# Patient Record
Sex: MALE | Race: OTHER | HISPANIC OR LATINO | Employment: FULL TIME | URBAN - NONMETROPOLITAN AREA
[De-identification: names, ages, dates, MRNs, and addresses within clinical notes are randomized per-mention and may not be internally consistent; named-entity substitution may affect disease eponyms.]

---

## 2020-12-11 ENCOUNTER — OFFICE VISIT (OUTPATIENT)
Dept: NEUROSURGERY | Facility: CLINIC | Age: 60
End: 2020-12-11

## 2020-12-11 VITALS — BODY MASS INDEX: 31.9 KG/M2 | WEIGHT: 215.4 LBS | HEIGHT: 69 IN | TEMPERATURE: 98.7 F | RESPIRATION RATE: 15 BRPM

## 2020-12-11 DIAGNOSIS — M54.2 DISCOGENIC CERVICAL PAIN: ICD-10-CM

## 2020-12-11 DIAGNOSIS — M53.9 MULTILEVEL DEGENERATIVE DISC DISEASE: ICD-10-CM

## 2020-12-11 DIAGNOSIS — M25.512 PAIN IN JOINT OF LEFT SHOULDER: ICD-10-CM

## 2020-12-11 DIAGNOSIS — M51.36 DISCOGENIC LOW BACK PAIN: Primary | ICD-10-CM

## 2020-12-11 PROCEDURE — 99204 OFFICE O/P NEW MOD 45 MIN: CPT | Performed by: NEUROLOGICAL SURGERY

## 2020-12-11 RX ORDER — NAPROXEN SODIUM 220 MG
220 TABLET ORAL 2 TIMES DAILY PRN
COMMUNITY

## 2020-12-11 RX ORDER — METHOCARBAMOL 750 MG/1
TABLET, FILM COATED ORAL
COMMUNITY
Start: 2020-12-08

## 2020-12-11 NOTE — PROGRESS NOTES
"  NAME: PORSHA DASILVA   DOS: 2020  : 1960  PCP: Jostin Mondragon MD    Chief Complaint:    Chief Complaint   Patient presents with   • Neck & Back pain   • Dizziness/Headaches   • Left shoulder pain       History of Present Illness:  60 y.o. male   I saw this 60-year-old in neurosurgical consultation he presented with a very complex history intake he reports no history of prior back issues he was injured on the job in 2 subsequent issues 1 where he was \"smashed into a metal cage \"the second where he had a lifting injury these were  by months apart he did not seek medical therapy for the first 1 but treated himself for left shoulder arm and neck pain with a sling and eventually made it through that until his second injury since that time though during both injuries it is difficult to ascertain the degree of back and neck issues have but he is clearly been plagued by symptoms of back pain instability gait shaking legs pain in his left shoulder and pain throughout his spinal axis    He has not seen an interventional pain [place] specialist    He is under the care of an orthopedist regarding his left shoulder he is here for evaluation he was reportedly turned down by Worker's Comp.      PMHX  Allergies:  Allergies   Allergen Reactions   • Codeine Other (See Comments)     Unsure of reaction      Medications    Current Outpatient Medications:   •  DICLOFENAC SODIUM PO, Take  by mouth., Disp: , Rfl:   •  naproxen sodium (ALEVE) 220 MG tablet, Take 220 mg by mouth 2 (Two) Times a Day As Needed., Disp: , Rfl:   •  methocarbamol (ROBAXIN) 750 MG tablet, , Disp: , Rfl:   Past Medical History:  Past Medical History:   Diagnosis Date   • Headache      Past Surgical History:  Past Surgical History:   Procedure Laterality Date   • KNEE SURGERY     • SHOULDER ARTHROSCOPY       Social Hx:  Social History     Tobacco Use   • Smoking status: Current Every Day Smoker   • Smokeless tobacco: Never Used "   Substance Use Topics   • Alcohol use: Yes   • Drug use: Never     Family Hx:  History reviewed. No pertinent family history.  Review of Systems:        Review of Systems   Constitutional: Positive for activity change. Negative for appetite change, chills, diaphoresis, fatigue, fever and unexpected weight change.   HENT: Negative for congestion, dental problem, drooling, ear discharge, ear pain, facial swelling, hearing loss, mouth sores, nosebleeds, postnasal drip, rhinorrhea, sinus pressure, sneezing, sore throat, tinnitus, trouble swallowing and voice change.    Eyes: Positive for visual disturbance. Negative for photophobia, pain, discharge, redness and itching.   Respiratory: Negative for apnea, cough, choking, chest tightness, shortness of breath, wheezing and stridor.    Cardiovascular: Negative for chest pain, palpitations and leg swelling.   Gastrointestinal: Positive for nausea. Negative for abdominal distention, abdominal pain, anal bleeding, blood in stool, constipation, diarrhea, rectal pain and vomiting.   Endocrine: Negative for cold intolerance, heat intolerance, polydipsia, polyphagia and polyuria.   Genitourinary: Negative for decreased urine volume, difficulty urinating, dysuria, enuresis, flank pain, frequency, genital sores, hematuria and urgency.   Musculoskeletal: Positive for back pain, neck pain and neck stiffness. Negative for arthralgias, gait problem, joint swelling and myalgias.   Skin: Negative for color change, pallor, rash and wound.   Allergic/Immunologic: Negative for environmental allergies, food allergies and immunocompromised state.   Neurological: Positive for tremors, light-headedness and headaches. Negative for dizziness, seizures, syncope, facial asymmetry, speech difficulty, weakness and numbness.   Hematological: Negative for adenopathy. Does not bruise/bleed easily.   Psychiatric/Behavioral: Positive for sleep disturbance. Negative for agitation, behavioral problems,  confusion, decreased concentration, dysphoric mood, hallucinations, self-injury and suicidal ideas. The patient is not nervous/anxious and is not hyperactive.    All other systems reviewed and are negative.       I have reviewed this note template and all pertinent parts of the review of systems social, family history, surgical history and medication list significant for headache dizziness but no evidence of TIAs or unilateral symptomatology to be consistent with stroke or amaurosis he has some vertiginous symptoms, significant for some Raynaud's types of phenomenon's in his left upper extremity that resolved    Suggestive  Physical Examination:  Vitals:    12/11/20 1116   Resp: 15   Temp: 98.7 °F (37.1 °C)      General Appearance:   Well developed, well nourished, well groomed, alert, and cooperative.  Neurological examination:  Neurologic Exam  He is awake alert and follows commands    Vital signs were reviewed and documented in the chart  Patient appeared in good neurologic function with normal comprehension fluent speech  Mood and affect are normal  Sense of smell deferred    Pupils symmetric equally reactive funduscopic exam not visualized   Visual fields intact to confrontation  Extraocular movements intact  Face motor function is symmetric  Facial sensations normal  Hearing intact to finger rub hearing intact to finger rub  Tongue is midline  Palate symmetric  Swallowing normal  Shoulder shrug normal  Pulses at the wrist are intact  His neck is supple without lesions with no masses    He has reasonable range of motion but protects with rotational maneuvers  He has no evidence of carotid bruits  Muscle bulk and tone normal  5 out of 5 strength no motor drift his strength is effort dependent throughout  Gait normal intact  Negative Romberg  No clonus long tract signs or myelopathy absolutely no signs of Chica's    Reflexes symmetric trace throughout  No edema noted and extremities skin appears  normal    Straight leg raise sign absent tender throughout  No signs of intrinsic hip dysfunction tender throughout  Back is without any lesions or abnormality  Feet are warm and well perfused      Review of Imaging/DATA:  I reviewed an MRI of his cervical spine as well as his lumbar spine they are somewhat impressive for chronic degenerative changes with multiple levels of degenerative disc disease and disc bulging in the lumbar spine he has two-level spondylolisthesis L3-4 L4-5 is had a lateral disc bulge at L5-S1 the spinal canal is patent he does have lateral recess of moderate intensity    His cervical spine shows multilevel intraforaminal disease with some buckling of the posterior ligament in the upper spine but the spinal cord is open the MRI is somewhat marginal quality but I see no STIR signal change  Diagnoses/Plan:    Mr. Reis is a 60 y.o. male   This gentleman presents with likely based on radiographs chronic changes of significant degenerative changes in the cervical and lumbar spine that being said based on his clinical intake he reports no prior history of cervical or lumbar issues and he reports 2 separate work-related injuries with exacerbation of neck and left shoulder issues as well as lumbar spinal issues    He also has a left shoulder issue that is undergoing orthopedic work-up with follow-up appropriately and his quality of life has been shattered by this whole set of events    From a neurosurgical standpoint I see nothing that would be an easy fix and I explained that to him he has multilevel degenerative changes in the neck combined with acute exacerbations and needs to undergo conservative therapy for neck and low back including physical therapy comprehensive pain management and likely some cervical and lumbar epidural injections    He also needs his therapy completed for his left shoulder    I recommended to him that he discuss with his  but legitimacy of his workers comp claim and  "refill that as it was \"turned down \"prior if indeed these accidents did occur on the job but that is a discussion to be had between he and his     He certainly is not going to return to work anytime soon I would like to check a cervical flexion-extension lumbar flexion-extension film he could be worked up further to consider surgical therapy but he is going to have to sort out his pain management issues and I explained that delineation of the multiple areas of arthritic and discogenic pain generators in the back are probably too extensive to warrant any sort of surgical repair of the lumbar spine the cervical spine he certainly does not have any myelopathic findings so I would recommend likely a nonsurgical pain management course for the foreseeable future    I will set up a follow-up to review his x-rays over the phone and will be happy to provide assistance in the future    "

## 2022-04-05 RX ORDER — DULOXETIN HYDROCHLORIDE 60 MG/1
CAPSULE, DELAYED RELEASE ORAL
Qty: 60 CAPSULE | Refills: 2 | Status: SHIPPED | OUTPATIENT
Start: 2022-04-05 | End: 2022-07-05

## 2022-07-05 RX ORDER — DULOXETIN HYDROCHLORIDE 60 MG/1
CAPSULE, DELAYED RELEASE ORAL
Qty: 60 CAPSULE | Refills: 2 | Status: SHIPPED | OUTPATIENT
Start: 2022-07-05

## 2022-09-19 RX ORDER — DULOXETIN HYDROCHLORIDE 60 MG/1
CAPSULE, DELAYED RELEASE ORAL
Qty: 60 CAPSULE | Refills: 2 | OUTPATIENT
Start: 2022-09-19